# Patient Record
Sex: FEMALE | Race: WHITE | Employment: FULL TIME | URBAN - METROPOLITAN AREA
[De-identification: names, ages, dates, MRNs, and addresses within clinical notes are randomized per-mention and may not be internally consistent; named-entity substitution may affect disease eponyms.]

---

## 2017-05-02 ENCOUNTER — OFFICE VISIT (OUTPATIENT)
Dept: FAMILY MEDICINE CLINIC | Facility: CLINIC | Age: 22
End: 2017-05-02

## 2017-05-02 VITALS
TEMPERATURE: 99 F | DIASTOLIC BLOOD PRESSURE: 72 MMHG | HEART RATE: 100 BPM | WEIGHT: 116 LBS | RESPIRATION RATE: 17 BRPM | OXYGEN SATURATION: 99 % | HEIGHT: 66.7 IN | SYSTOLIC BLOOD PRESSURE: 99 MMHG | BODY MASS INDEX: 18.42 KG/M2

## 2017-05-02 DIAGNOSIS — Z12.4 SCREENING FOR CERVICAL CANCER: ICD-10-CM

## 2017-05-02 DIAGNOSIS — Z11.8 SCREENING FOR CHLAMYDIAL DISEASE: ICD-10-CM

## 2017-05-02 DIAGNOSIS — Z13.6 SCREENING FOR HEART DISEASE: ICD-10-CM

## 2017-05-02 DIAGNOSIS — Z00.00 ANNUAL PHYSICAL EXAM: Primary | ICD-10-CM

## 2017-05-02 DIAGNOSIS — Z13.29 SCREENING FOR ENDOCRINE, NUTRITIONAL, METABOLIC AND IMMUNITY DISORDER: ICD-10-CM

## 2017-05-02 DIAGNOSIS — Z13.228 SCREENING FOR ENDOCRINE, NUTRITIONAL, METABOLIC AND IMMUNITY DISORDER: ICD-10-CM

## 2017-05-02 DIAGNOSIS — Z13.0 SCREENING FOR ENDOCRINE, NUTRITIONAL, METABOLIC AND IMMUNITY DISORDER: ICD-10-CM

## 2017-05-02 DIAGNOSIS — Z13.21 SCREENING FOR ENDOCRINE, NUTRITIONAL, METABOLIC AND IMMUNITY DISORDER: ICD-10-CM

## 2017-05-02 PROCEDURE — 88175 CYTOPATH C/V AUTO FLUID REDO: CPT | Performed by: FAMILY MEDICINE

## 2017-05-02 PROCEDURE — 87491 CHLMYD TRACH DNA AMP PROBE: CPT | Performed by: FAMILY MEDICINE

## 2017-05-02 PROCEDURE — 90715 TDAP VACCINE 7 YRS/> IM: CPT | Performed by: FAMILY MEDICINE

## 2017-05-02 PROCEDURE — 87591 N.GONORRHOEAE DNA AMP PROB: CPT | Performed by: FAMILY MEDICINE

## 2017-05-02 PROCEDURE — 99395 PREV VISIT EST AGE 18-39: CPT | Performed by: FAMILY MEDICINE

## 2017-05-02 PROCEDURE — 90471 IMMUNIZATION ADMIN: CPT | Performed by: FAMILY MEDICINE

## 2017-05-02 RX ORDER — CETIRIZINE HYDROCHLORIDE 10 MG/1
10 TABLET ORAL DAILY
COMMUNITY

## 2017-05-02 NOTE — PATIENT INSTRUCTIONS
Perform labs fasting 8 hours with water or black coffee or or black tea diet  soda only prior to exam.    -Encourage healthy diet of whole food and avoid processed food and sugary drinks and sodas.   Diet should include lean meats and vegetables including 5 Vitamin D comes in several forms. When ultraviolet light, such as sunlight, hits your skin, it creates vitamin D3. D2 is used to fortify dairy foods. Both of these are further processed by your liver and kidneys into a form your body can use.  Most tests fo Children and adults need more than 30 nanograms per milliliter (ng/ml) of vitamin D. The optimal level of 25(OH)D is usually between 30 and 60 ng/mL. Recommended daily amounts range from 400 to 800 international units (IU) per day based on your age.   Level Vitamin B-12 and Folate  Does this test have other names? Cobalamin, Cbl, folic acid, FA  What is this test?  This test measures the levels of vitamin B-12 and folate in your blood.   Your body needs vitamin B-12, also called cobalamin, and folate, also ca Your healthcare provider may also order these tests:  · Homocysteine and methylmalonate, or methylmalonic acid. These substances can build up in your body if you have a B-12 or folate deficiency. · Red blood cell  · Pernicious anemia.  This includes measur Taking a blood sample with a needle carries risks that include bleeding, infection, bruising, or feeling dizzy. When the needle pricks your arm, you may feel a slight stinging sensation or pain. Afterward, the site may be slightly sore.   What might affect Nonfat milk   302 mg/1 cup   [place], sockeye, canned, with bones   239 mg/3 oz.   Tofu made with calcium sulfate   204 mg/3 oz.    Low-fat milk   297 mg/1 cup   Soybeans, fresh, boiled   131 mg/1/2 cup   Collards   179 mg/1/2 cup   Swiss cheese   272 mg/1 o · Certain medicines, such as cortisone, increase bone loss. They also decrease bone growth. Ask your healthcare provider about any side effects of your medicines, and how to prevent them.   · Protein-rich or salty foods eaten in large amounts may deplete ca Screening tests and vaccines are an important part of managing your health. Health counseling is essential, too. Below are guidelines for these, for women ages 25 to 44. Talk with your healthcare provider to make sure you’re up-to-date on what you need.   Shaq White Chickenpox (varicella) All women in this age group who have no record of this infection or vaccine 2 doses; the second dose should be given 4 to 8 weeks after the first dose   Hepatitis A Women at increased risk for infection – talk with your healthcare pr Breast cancer and chemoprevention Women at high risk for breast cancer When your risk is known   Diet and exercise Women who are overweight or obese When diagnosed, and then at routine exams   Domestic violence Women at the age in which they are able to ha When symptoms do occur, they usually start 2 to 10 days after you are exposed to chlamydia. You may have a thick vaginal discharge. You may have pain or burning when you urinate.  The infection is called pelvic inflammatory disease (PID) if it spreads to th When to seek medical advice  Call your healthcare provider right away if any of these occur:  · You don’t get better after 3 days of treatment  · New pain in your lower abdomen or back  · Pain in your lower abdomen or back gets worse  · Unexpected vaginal · You have an STD. The STD may cause sores or other health problems that increase your vulnerability to new infections. Your risk will stay high unless you change the behaviors that put you at risk of the current infection.   Prevent future problems  Left u Vaccines (also called immunizations) are available to prevent hepatitis A and hepatitis B. These are two kinds of STDs. There is also a vaccine to prevent HPV. This is a virus that can be passed from person to person through sexual contact.  Ask your health

## 2017-05-02 NOTE — PROGRESS NOTES
REASON FOR VISIT:    Prerna Nelson is a 23year old female who presents for an [address]     No complaints      Menses age 13 onset q 30-60 days  No prior pap  +Bf monogamous, long distance  Uses condoms as contraception  No medications * 08/12/2015    10/17/2014       Lab Results  Component Value Date   AST 18 03/19/2016   AST 23 08/12/2015   AST 12* 10/17/2014       Lab Results  Component Value Date   ALT 23 03/19/2016   ALT 24 08/12/2015   ALT 20 10/17/2014       Lab Resu Flex Sigmoidoscopy Screen  Every 5 years No results found for this or any previous visit. Fecal Occult Blood  Annually No results found for: FOB, OCCULTSTOOL    Obesity Screening Screen all adults annually Body mass index is 18.34 kg/(m^2).       Prevent Asthma  (Annually between Nov. 1 & Dec. 31)    Date of last AAP/ACT and counseling given on importance of controller meds.                  ALLERGIES:     Dander                  Runny nose, Itching    Comment:Cat dander  Nickel                  Rash  Seaso : denies dysuria, vaginal discharge or itching, periods regular   MUSCULOSKELETAL: denies back pain  NEURO: denies headaches  PSYCHE: denies depression or anxiety  HEMATOLOGIC: denies hx of anemia  ENDOCRINE: denies thyroid history  ALL/ASTHMA: denies hx -encouraged to continue not smoking  -safe sex practices - recommend condom use  -immunizations-bring in if available tdap updated  -Vitamin D3  2000 units daily recommended  -Needs 1000 mg of calcium daily for osteoporosis prevention discussed  -thin prep

## 2017-05-05 ENCOUNTER — TELEPHONE (OUTPATIENT)
Dept: FAMILY MEDICINE CLINIC | Facility: CLINIC | Age: 22
End: 2017-05-05

## 2017-05-05 NOTE — PROGRESS NOTES
Quick Note:    Normal Pap smear. Repeat in 3 years . Chlamydia/GC neg- will not release in epic.  Call pt please.  ______

## 2017-05-05 NOTE — TELEPHONE ENCOUNTER
LMOM to return my call. I advised patient to call [phone] along with office hours given.      Notes Recorded by Fay Yeung DO on 5/4/2017 at 8:53 PM  Normal Pap smear. Lisa Lindsey in 3 years . Chlamydia/GC neg- will not release in [facility].  Call pt pl

## 2017-05-08 NOTE — TELEPHONE ENCOUNTER
Spoke to patient regarding test results. Patient verbalized understanding and will repeat pap in 3 years.      Future Appointments  Date Time Provider Mary Grace Lopez   6/6/2017 12:20 PM Magda Montejo DO [facility]       Notes Recorded by Jose Juan Abrams,

## 2017-05-25 ENCOUNTER — LAB ENCOUNTER (OUTPATIENT)
Dept: LAB | Age: 22
End: 2017-05-25
Attending: FAMILY MEDICINE
Payer: COMMERCIAL

## 2017-05-25 DIAGNOSIS — Z13.29 SCREENING FOR ENDOCRINE, NUTRITIONAL, METABOLIC AND IMMUNITY DISORDER: ICD-10-CM

## 2017-05-25 DIAGNOSIS — Z13.21 SCREENING FOR ENDOCRINE, NUTRITIONAL, METABOLIC AND IMMUNITY DISORDER: ICD-10-CM

## 2017-05-25 DIAGNOSIS — Z13.0 SCREENING FOR ENDOCRINE, NUTRITIONAL, METABOLIC AND IMMUNITY DISORDER: ICD-10-CM

## 2017-05-25 DIAGNOSIS — Z13.228 SCREENING FOR ENDOCRINE, NUTRITIONAL, METABOLIC AND IMMUNITY DISORDER: ICD-10-CM

## 2017-05-25 DIAGNOSIS — Z00.00 ANNUAL PHYSICAL EXAM: ICD-10-CM

## 2017-05-25 DIAGNOSIS — Z13.6 SCREENING FOR HEART DISEASE: ICD-10-CM

## 2017-05-25 PROCEDURE — 80061 LIPID PANEL: CPT | Performed by: FAMILY MEDICINE

## 2017-05-25 PROCEDURE — 84443 ASSAY THYROID STIM HORMONE: CPT | Performed by: FAMILY MEDICINE

## 2017-05-25 PROCEDURE — 36415 COLL VENOUS BLD VENIPUNCTURE: CPT | Performed by: FAMILY MEDICINE

## 2017-05-25 PROCEDURE — 80053 COMPREHEN METABOLIC PANEL: CPT | Performed by: FAMILY MEDICINE

## 2017-05-25 PROCEDURE — 82607 VITAMIN B-12: CPT | Performed by: FAMILY MEDICINE

## 2017-05-26 ENCOUNTER — TELEPHONE (OUTPATIENT)
Dept: FAMILY MEDICINE CLINIC | Facility: CLINIC | Age: 22
End: 2017-05-26

## 2017-05-26 DIAGNOSIS — Z13.0 SCREENING FOR IRON DEFICIENCY ANEMIA: Primary | ICD-10-CM

## 2017-05-26 NOTE — TELEPHONE ENCOUNTER
Patient had CBC drawn, but it was sent to [facility] in an incorrect tube. CBC needs to be re-ordered and re-drawn. CBC pended, will notify patient to have it drawn once signed, thanks.

## 2017-05-30 NOTE — TELEPHONE ENCOUNTER
Left message for pt to have lab redrawn.  Office and lab hours given, office phone # provided for questions

## 2017-06-02 ENCOUNTER — LAB ENCOUNTER (OUTPATIENT)
Dept: LAB | Age: 22
End: 2017-06-02
Attending: FAMILY MEDICINE
Payer: COMMERCIAL

## 2017-06-02 DIAGNOSIS — Z13.0 SCREENING FOR IRON DEFICIENCY ANEMIA: ICD-10-CM

## 2017-06-02 PROCEDURE — 36415 COLL VENOUS BLD VENIPUNCTURE: CPT | Performed by: FAMILY MEDICINE

## 2017-06-02 PROCEDURE — 85025 COMPLETE CBC W/AUTO DIFF WBC: CPT | Performed by: FAMILY MEDICINE

## 2017-06-06 ENCOUNTER — OFFICE VISIT (OUTPATIENT)
Dept: FAMILY MEDICINE CLINIC | Facility: CLINIC | Age: 22
End: 2017-06-06

## 2017-06-06 VITALS
DIASTOLIC BLOOD PRESSURE: 52 MMHG | RESPIRATION RATE: 16 BRPM | HEART RATE: 88 BPM | OXYGEN SATURATION: 98 % | WEIGHT: 114 LBS | TEMPERATURE: 98 F | BODY MASS INDEX: 18 KG/M2 | SYSTOLIC BLOOD PRESSURE: 100 MMHG

## 2017-06-06 DIAGNOSIS — N91.5 OLIGOMENORRHEA: ICD-10-CM

## 2017-06-06 DIAGNOSIS — Z83.49 FAMILY HISTORY OF HEMOCHROMATOSIS: ICD-10-CM

## 2017-06-06 DIAGNOSIS — Z86.39 HISTORY OF VITAMIN D DEFICIENCY: Primary | ICD-10-CM

## 2017-06-06 DIAGNOSIS — Z30.09 BIRTH CONTROL COUNSELING: ICD-10-CM

## 2017-06-06 DIAGNOSIS — D68.51 HETEROZYGOUS FACTOR V LEIDEN MUTATION (HCC): ICD-10-CM

## 2017-06-06 PROCEDURE — 99214 OFFICE O/P EST MOD 30 MIN: CPT | Performed by: FAMILY MEDICINE

## 2017-06-06 RX ORDER — MULTIVITAMIN
1 TABLET ORAL DAILY
COMMUNITY

## 2017-06-06 NOTE — PATIENT INSTRUCTIONS
-start vitamin D3 2000iu daily. You will  need to maintain levels in the [facility] and take daily over-the-counter .   May obtain 15 minutes of sun but not greater from 10 AM to 2 PM March through October to help the body make vitamin D without suns It is normal for a cycle to take 21 to 34 days. For teenagers, the time between periods might be more or less. For adults, it will be around a month from the first day of 1 period to the first day of the next.  That’s why you may hear women talk about a “mo The egg and lining are shed  If pregnancy doesn’t occur, the egg and thickened lining of the uterus are no longer needed. They are then shed through the vagina. This is called a menstrual period. How long is each cycle?   It is normal for a cycle to take 2 Vitamin D testing has become much more popular in recent years. Your healthcare provider may check your vitamin D levels to find out if you have any risks to bone health.  These might be:  · Low calcium  · Soft bones caused by low vitamin D or problems usin Above-normal levels may be a sign that you're taking too much in supplement form.   How is this test done? The test requires a blood sample, which is drawn through a needle from a vein in your arm. Does this test pose any risks?   Taking a blood sample wi

## 2017-06-07 PROBLEM — N91.5 OLIGOMENORRHEA: Status: ACTIVE | Noted: 2017-06-07

## 2017-06-08 NOTE — PROGRESS NOTES
CHIEF COMPLAINT: Patient presents with:  Lab Results: review        HPI:     Kwan Weinberg is a 23year old female presents for admits to menstrual cycle q 40 days x 4 days, light. No cramps. no dyspareunia. Monogamous relationship.  Considering birth contr agreed except as otherwise stated in HPI.  ROS:     Review of Systems  Positive for stated complaint: oligomenorrhea     Pertinent positives and negatives noted in the the HPI.     PHYSICAL EXAM:   /52 mmHg  Pulse 88  Temp(Src) 98.3 °F (36.8 °C)  Resp Date: 06/02/2017   Neutrophil % Value: 44.2(%)  Date: 06/02/2017   Lymphocyte % Value: 41.2(%)  Date: 06/02/2017   Monocyte % Value: 8.5(%)  Date: 06/02/2017   Eosinophil % Value: 5.1(%)  Date: 06/02/2017   Basophil % Value: 0.9(%)  Date: 06/02/2017   Diana PM          Ordering Location:     [facility], [place] Received:            05/03/2017 09:49 AM                                 Rd, Tr[place]                                                                   First Screen:          Sam Fernandez to discuss with OB when pregnant and pedal pumps q 15 on airplane  - OBG Referral - In Network    4. Irregular menses  Suspect anovulation  PCO, endometriosis- no pain/dyspareunia or cramps  - OBG Referral - In Network    5.  Birth control counseling  Discu

## (undated) NOTE — MR AVS SNAPSHOT
[facility] [place]  [address] [id number] [phone]               Thank you for choosing us for your health care visit with Marianne Pop DO. We are glad to serve you and happy to provide you with this summary of your visit.   Pl Assoc Dx: Annual physical exam [Z00.00], Screening for chlamydial disease [Z11.8]                 Follow-up Instructions     Return in about 1 month (around 6/2/2017) for discuss labs, if needed.          Reason for Today's Visit     Physical           Me Vitamin D is mainly found in fortified dairy foods, juice, breakfast cereal, and certain fish. This vitamin plays many roles in the body. But because it helps the body absorb calcium from foods and supplements, it's particularly important for bone health. different from the normal value, you may not have a problem. To learn what the results mean for you, talk with your healthcare provider. Children and adults need more than 30 nanograms per milliliter (ng/ml) of vitamin D.  The optimal level of 25(OH)D [place] [address]. All rights reserved. This information is not intended as a substitute for professional medical care. Always follow your healthcare professional's instructions. Vitamin B-12 and Folate  Does this test have other names?   Cobalamin, Cbl, folic a healthcare provider may order this combined test if you have a condition for which it's important to know both levels.   What other tests might I have along with this test?  Your healthcare provider may also order these tests:  · Homocysteine and methylmalo vein in your arm. Does this test pose any risks? Taking a blood sample with a needle carries risks that include bleeding, infection, bruising, or feeling dizzy. When the needle pricks your arm, you may feel a slight stinging sensation or pain.  Afterward, bones   351 mg/3 oz.   Oatmeal, instant, fortified   215 mg/1 cup   Nonfat milk   302 mg/1 cup   [place], sockeye, canned, with bones   239 mg/3 oz.   Tofu made with calcium sulfate   204 mg/3 oz.    Low-fat milk   297 mg/1 cup   Soybeans, fresh, boiled   13 · Certain medicines, such as cortisone, increase bone loss. They also decrease bone growth. Ask your healthcare provider about any side effects of your medicines, and how to prevent them.   · Protein-rich or salty foods eaten in large amounts may deplete ca these, for women ages 25 to 44. Talk with your healthcare provider to make sure you’re up-to-date on what you need.   Screening Who needs it How often   Alcohol misuse All women in this age group At routine exams   Blood pressure All women in this age group Hepatitis A Women at increased risk for infection – talk with your healthcare provider 2 doses given at least 6 months apart   Hepatitis B Women at increased risk for infection – talk with your healthcare provider 3 doses over 6 months; second dose should then at routine exams   Domestic violence Women at the age in which they are able to have children At routine exams   Sexually transmitted infection prevention Women who are sexually active At routine exams   Skin cancer Prevention of skin cancer in fair-s inflammatory disease (PID) if it spreads to the Fallopian tubes. This causes lower abdominal pain and fever. Chlamydia that isn’t treated can make you unable to have children (infertility). This is because it harms the Fallopian tubes.  PID also makes it mo · Pain in your lower abdomen or back gets worse  · Unexpected vaginal bleeding  · Weakness, dizziness, or fainting  · Repeated vomiting  · You can’t urinate because of pain  · Rash or joint pain  · Painful open sores around the outer vagina  · Enlarged, pa · Your sexual partner must be treated at the same time. This is true even if he or she has no symptoms. Your partner should contact his or her own health care provider for treatment.  He or she can also go to an urgent care clinic or the public health depar [address]. All rights reserved. This information is not intended as a substitute for professional medical care. Always follow your healthcare professional's instructions. What Are Sexually Transmitted Diseases (STDs)?   A sexually transmitted disease (ST healthcare provider. If you notice a change in how your body looks or feels, have it checked out. But keep in mind, STDs don’t always show symptoms. So if you’re at risk of STDs, get checked regularly.  If you find you have an STD, be sure your partner gets This list is accurate as of: 5/2/17  4:17 PM.  Always use your most recent med list.                cetirizine 10 MG Tabs   Take 10 mg by mouth daily.    Commonly known as:  ZYRTEC           Diclofenac Sodium 2 % Soln   Place 1 packet onto the skin 2 (two)

## (undated) NOTE — MR AVS SNAPSHOT
[facility] [place]  [address] [address] [phone]               Thank you for choosing us for your health care visit with Rowena Cr DO. We are glad to serve you and happy to provide you with this summary of your visit.   Pl [address], [address]  [address] [facility], [address] [place] [address]     [phone] 9509 [facility] [address] [place] Fax: [phone]      Understanding the Normal Menstrual Cycle  Having a period (menstruation) is a normal, healthy part of being a woman. It’s also part of the menstrual cycle, a process that makes it possible for women to become pregnant.  The first da © 6941-0650 [address], [address]. All rights reserved. This information is not intended as a substitute for professional medical care. Always follow your healthcare professional's instructions.         Samy David normal. Not everyone has a 28-day cycle.    How long does a period last?  It’s normal for a period to last 2 to 8 days. Talk to your health care provider if your period lasts longer than 8 days for 2 cycles in a row.   Date Last Reviewed: 5/10/2015  © 2000- Vitamin D has many effects in the body.  You may need this test to help your provider diagnose or treat:  · Problems with the parathyroid gland  · Cancer  · Autoimmune diseases such as multiple sclerosis and Crohn's disease  · Psoriasis  · Asthma  · Weaknes take vitamin D in supplement form can affect your vitamin D levels. Ask your healthcare provider if any health conditions you have or medicines you take could affect your results.   How do I get ready for this test?  Tell your healthcare provider if you chapis You can access your MyChart to more actively manage your health care and view more details from this visit by going to [email or web address].   If you've recently had a stay at the Hospital you can access your discharge instructions in [address] by eddie